# Patient Record
Sex: MALE | Race: WHITE
[De-identification: names, ages, dates, MRNs, and addresses within clinical notes are randomized per-mention and may not be internally consistent; named-entity substitution may affect disease eponyms.]

---

## 2018-04-18 ENCOUNTER — HOSPITAL ENCOUNTER (EMERGENCY)
Dept: HOSPITAL 17 - NED | Age: 28
LOS: 1 days | Discharge: LEFT BEFORE DISCHARGE | End: 2018-04-19
Payer: SELF-PAY

## 2018-04-18 VITALS
HEART RATE: 56 BPM | RESPIRATION RATE: 18 BRPM | DIASTOLIC BLOOD PRESSURE: 92 MMHG | SYSTOLIC BLOOD PRESSURE: 150 MMHG | TEMPERATURE: 98.7 F

## 2018-04-18 DIAGNOSIS — R42: Primary | ICD-10-CM

## 2018-04-18 PROCEDURE — 99281 EMR DPT VST MAYX REQ PHY/QHP: CPT

## 2018-04-20 ENCOUNTER — HOSPITAL ENCOUNTER (EMERGENCY)
Dept: HOSPITAL 17 - NEPD | Age: 28
Discharge: HOME | End: 2018-04-20
Payer: MEDICAID

## 2018-04-20 VITALS
DIASTOLIC BLOOD PRESSURE: 79 MMHG | RESPIRATION RATE: 16 BRPM | OXYGEN SATURATION: 98 % | HEART RATE: 88 BPM | SYSTOLIC BLOOD PRESSURE: 145 MMHG

## 2018-04-20 VITALS
OXYGEN SATURATION: 100 % | TEMPERATURE: 98.3 F | SYSTOLIC BLOOD PRESSURE: 150 MMHG | HEART RATE: 57 BPM | DIASTOLIC BLOOD PRESSURE: 87 MMHG | RESPIRATION RATE: 18 BRPM

## 2018-04-20 VITALS — HEIGHT: 70 IN | BODY MASS INDEX: 23.67 KG/M2 | WEIGHT: 165.35 LBS

## 2018-04-20 DIAGNOSIS — R06.02: Primary | ICD-10-CM

## 2018-04-20 DIAGNOSIS — I45.10: ICD-10-CM

## 2018-04-20 LAB
BASOPHILS # BLD AUTO: 0.1 TH/MM3 (ref 0–0.2)
BASOPHILS NFR BLD: 0.7 % (ref 0–2)
BUN SERPL-MCNC: 14 MG/DL (ref 7–18)
CALCIUM SERPL-MCNC: 9.3 MG/DL (ref 8.5–10.1)
CHLORIDE SERPL-SCNC: 106 MEQ/L (ref 98–107)
CREAT SERPL-MCNC: 1.05 MG/DL (ref 0.6–1.3)
EOSINOPHIL # BLD: 0.2 TH/MM3 (ref 0–0.4)
EOSINOPHIL NFR BLD: 2.1 % (ref 0–4)
ERYTHROCYTE [DISTWIDTH] IN BLOOD BY AUTOMATED COUNT: 13.2 % (ref 11.6–17.2)
GFR SERPLBLD BASED ON 1.73 SQ M-ARVRAT: 85 ML/MIN (ref 89–?)
GLUCOSE SERPL-MCNC: 92 MG/DL (ref 74–106)
HCO3 BLD-SCNC: 28 MEQ/L (ref 21–32)
HCT VFR BLD CALC: 42.2 % (ref 39–51)
HGB BLD-MCNC: 14.8 GM/DL (ref 13–17)
LYMPHOCYTES # BLD AUTO: 3.7 TH/MM3 (ref 1–4.8)
LYMPHOCYTES NFR BLD AUTO: 48.8 % (ref 9–44)
MCH RBC QN AUTO: 29.7 PG (ref 27–34)
MCHC RBC AUTO-ENTMCNC: 35 % (ref 32–36)
MCV RBC AUTO: 84.9 FL (ref 80–100)
MONOCYTE #: 0.5 TH/MM3 (ref 0–0.9)
MONOCYTES NFR BLD: 7.1 % (ref 0–8)
NEUTROPHILS # BLD AUTO: 3.2 TH/MM3 (ref 1.8–7.7)
NEUTROPHILS NFR BLD AUTO: 41.3 % (ref 16–70)
PLATELET # BLD: 248 TH/MM3 (ref 150–450)
PMV BLD AUTO: 8 FL (ref 7–11)
RBC # BLD AUTO: 4.97 MIL/MM3 (ref 4.5–5.9)
SODIUM SERPL-SCNC: 140 MEQ/L (ref 136–145)
WBC # BLD AUTO: 7.7 TH/MM3 (ref 4–11)

## 2018-04-20 PROCEDURE — 94664 DEMO&/EVAL PT USE INHALER: CPT

## 2018-04-20 PROCEDURE — 80048 BASIC METABOLIC PNL TOTAL CA: CPT

## 2018-04-20 PROCEDURE — 93005 ELECTROCARDIOGRAM TRACING: CPT

## 2018-04-20 PROCEDURE — 99285 EMERGENCY DEPT VISIT HI MDM: CPT

## 2018-04-20 PROCEDURE — 94640 AIRWAY INHALATION TREATMENT: CPT

## 2018-04-20 PROCEDURE — 96360 HYDRATION IV INFUSION INIT: CPT

## 2018-04-20 PROCEDURE — 85025 COMPLETE CBC W/AUTO DIFF WBC: CPT

## 2018-04-20 PROCEDURE — 71046 X-RAY EXAM CHEST 2 VIEWS: CPT

## 2018-04-20 PROCEDURE — 96372 THER/PROPH/DIAG INJ SC/IM: CPT

## 2018-04-20 RX ADMIN — IPRATROPIUM BROMIDE AND ALBUTEROL SULFATE SCH AMPULE: .5; 3 SOLUTION RESPIRATORY (INHALATION) at 21:09

## 2018-04-20 NOTE — PD
HPI


Chief Complaint:  Respiratory Symptoms


Time Seen by Provider:  20:36


Travel History


International Travel<30 days:  No


Contact w/Intl Traveler<30days:  No


Traveled to known affect area:  No





History of Present Illness


HPI


27-year-old male arrives to the ER with complaint of dyspnea.  The patient 

reports breathing is as if he is breathing through a straw.  Symptoms are 

constant.  It has been so for years.  The patient reports 1 month ago he began 

feeling lightheaded and dizzy.  He reports is as if he is walking remains.  He 

also reports right abdomen discomfort which happened for 3 weeks and then went 

away for a week and then return for 1 week.  He notes blood in stool in the 

mornings for 2 days.  No similar prior events.  No fever.  Patient also reports 

a cough occasionally productive of clear sputum.  No chest pain.





PFSH


Past Medical History


Medical History:  Denies Significant Hx





Past Surgical History


Ear Surgery:  Yes





Social History


Alcohol Use:  Yes (occasional)


Tobacco Use:  No


Substance Use:  No





Allergies-Medications


(Allergen,Severity, Reaction):  


Coded Allergies:  


     No Known Allergies (Unverified , 4/20/18)


Reported Meds & Prescriptions





Reported Meds & Active Scripts


Active


Proair Hfa 8.5 GM Inh (Albuterol Sulfate) 90 Mcg/Act Aer 2 Puff INH Q6H PRN


     108 mcg/actuation








Review of Systems


Except as stated in HPI:  all other systems reviewed are Neg


General / Constitutional:  No: Fever





Physical Exam


Narrative


GENERAL: 27-year-old male well-nourished well-developed no acute distress


RECTAL: There is no significant hemorrhoid internal or external.  Stool is 

guaiac negative.


Vital Signs








  Date Time  Temp Pulse Resp B/P (MAP) Pulse Ox O2 Delivery O2 Flow Rate FiO2


 


4/20/18 19:07 98.3 57 18 150/87 (108) 100   








SKIN: Warm and dry.


HEAD: Atraumatic. Normocephalic. 


EYES: Pupils equal and round. No scleral icterus. No injection or drainage. 


ENT: No nasal bleeding or discharge.  Mucous membranes pink and moist.


NECK: Trachea midline. No JVD. 


CARDIOVASCULAR: Regular rate and rhythm.  


RESPIRATORY: No significant tachypnea or accessory muscle use.


GASTROINTESTINAL: Abdomen soft, non-tender, nondistended. Hepatic and splenic 

margins not palpable. 


MUSCULOSKELETAL: Extremities without clubbing, cyanosis, or edema. No obvious 

deformities. 


NEUROLOGICAL: Awake and alert. No obvious cranial nerve deficits.  Motor 

grossly within normal limits. Five out of 5 muscle strength in the arms and 

legs.  Normal speech.


PSYCHIATRIC: Appropriate mood and affect; insight and judgment normal.





Data


Data


Last Documented VS





Vital Signs








  Date Time  Temp Pulse Resp B/P (MAP) Pulse Ox O2 Delivery O2 Flow Rate FiO2


 


4/20/18 19:07 98.3 57 18 150/87 (108) 100   








Orders





 Orders


Complete Blood Count With Diff (4/20/18 20:50)


Basic Metabolic Panel (Bmp) (4/20/18 20:50)


Iv Access Insert/Monitor (4/20/18 20:50)


Electrocardiogram (4/20/18 20:50)


Ecg Monitoring (4/20/18 20:50)


Oximetry (4/20/18 20:50)


Oxygen Administration (4/20/18 20:50)


Chest, Pa & Lat (4/20/18 20:50)


Sodium Chloride 0.9% Flush (Ns Flush) (4/20/18 21:00)


Albuterol-Ipratropium Neb (Duoneb Neb) (4/20/18 21:00)


Dexamethasone Inj (Decadron Inj) (4/20/18 21:00)


Sodium Chlor 0.9% 1000 Ml Inj (Ns 1000 M (4/20/18 21:00)





Labs





Laboratory Tests








Test


  4/20/18


21:35


 


White Blood Count 7.7 TH/MM3 


 


Red Blood Count 4.97 MIL/MM3 


 


Hemoglobin 14.8 GM/DL 


 


Hematocrit 42.2 % 


 


Mean Corpuscular Volume 84.9 FL 


 


Mean Corpuscular Hemoglobin 29.7 PG 


 


Mean Corpuscular Hemoglobin


Concent 35.0 % 


 


 


Red Cell Distribution Width 13.2 % 


 


Platelet Count 248 TH/MM3 


 


Mean Platelet Volume 8.0 FL 


 


Neutrophils (%) (Auto) 41.3 % 


 


Lymphocytes (%) (Auto) 48.8 % 


 


Monocytes (%) (Auto) 7.1 % 


 


Eosinophils (%) (Auto) 2.1 % 


 


Basophils (%) (Auto) 0.7 % 


 


Neutrophils # (Auto) 3.2 TH/MM3 


 


Lymphocytes # (Auto) 3.7 TH/MM3 


 


Monocytes # (Auto) 0.5 TH/MM3 


 


Eosinophils # (Auto) 0.2 TH/MM3 


 


Basophils # (Auto) 0.1 TH/MM3 


 


CBC Comment DIFF FINAL 


 


Differential Comment  


 


Blood Urea Nitrogen 14 MG/DL 


 


Creatinine 1.05 MG/DL 


 


Random Glucose 92 MG/DL 


 


Calcium Level 9.3 MG/DL 


 


Sodium Level 140 MEQ/L 


 


Potassium Level 3.7 MEQ/L 


 


Chloride Level 106 MEQ/L 


 


Carbon Dioxide Level 28.0 MEQ/L 


 


Anion Gap 6 MEQ/L 


 


Estimat Glomerular Filtration


Rate 85 ML/MIN 


 











MDM


Medical Decision Making


Medical Screen Exam Complete:  Yes


Emergency Medical Condition:  Yes


Medical Record Reviewed:  Yes


Differential Diagnosis


Anemia, asthma, pneumonia, electrolyte imbalance


Narrative Course


CBC & BMP Diagram


4/20/18 21:35








Calcium Level 9.3





cxr: nacpd


EKG shows sinus rhythm with an incomplete right bundle branch block pattern 

with a rate of 63





Diagnosis





 Primary Impression:  


 Dyspnea


 Qualified Codes:  R06.09 - Other forms of dyspnea


 Additional Impression:  


 RBBB


Referrals:  


Primary Care Physician


2 days


***Med/Other Pt SpecificInfo:  Prescription(s) given


Scripts


Albuterol 8.5 GM Inh (Proair Hfa 8.5 GM Inh) 90 Mcg/Act Aer


2 PUFF INH Q6H Y for SHORTNESS OF BREATH, #1 INHALER 0 Refills


   108 mcg/actuation


   Prov: Elgin Pelayo MD         4/20/18


Disposition:  01 DISCHARGE HOME


Condition:  Stable











Elgin Pelayo MD Apr 20, 2018 21:45

## 2018-04-20 NOTE — RADRPT
EXAM DATE/TIME:  04/20/2018 21:00 

 

[facility] COMPARISON:     

No previous studies available for comparison.

 

                     

INDICATIONS :     

Shortness of breath, cough, and dizziness.

                     

 

MEDICAL HISTORY :     

None.          

 

SURGICAL HISTORY :     

None.   

 

ENCOUNTER:     

Initial                                        

 

ACUITY:     

3 months      

 

PAIN SCORE:     

0/10

 

LOCATION:      

chest 

 

FINDINGS:     

PA and lateral views of the chest demonstrate the lungs to be symmetrically aerated without evidence 
of mass, infiltrate or effusion.  The cardiomediastinal contours are unremarkable.  Osseous structure
s are intact.

 

CONCLUSION:     No acute disease.  

 

 

 

 Jonathon Rivas MD on April 20, 2018 at 21:56           

Board Certified Radiologist.

 This report was verified electronically.

## 2018-04-21 NOTE — EKG
Date Performed: 04/20/2018       Time Performed: 21:48:19

 

PTAGE:      27 years

 

EKG:      Sinus rhythm 

 

 WITH SINUS ARRHYTHMIA INCOMPLETE RIGHT BUNDLE BRANCH BLOCK Since the previous tracing, no significan
t change noted. The prior tracing was pediatric BORDERLINE ECG

 

PREVIOUS TRACING       : 5/11/2000 @ 1947

 

DOCTOR:   Champ Nguyễn  Interpretating Date/Time  04/21/2018 19:01:02